# Patient Record
Sex: FEMALE | Race: WHITE | NOT HISPANIC OR LATINO | Employment: FULL TIME | URBAN - METROPOLITAN AREA
[De-identification: names, ages, dates, MRNs, and addresses within clinical notes are randomized per-mention and may not be internally consistent; named-entity substitution may affect disease eponyms.]

---

## 2018-03-05 ENCOUNTER — OFFICE VISIT CONVERTED (OUTPATIENT)
Dept: CARDIOLOGY | Facility: CLINIC | Age: 48
End: 2018-03-05
Attending: INTERNAL MEDICINE

## 2018-03-07 ENCOUNTER — OFFICE VISIT CONVERTED (OUTPATIENT)
Dept: FAMILY MEDICINE CLINIC | Facility: CLINIC | Age: 48
End: 2018-03-07
Attending: NURSE PRACTITIONER

## 2018-04-24 ENCOUNTER — CONVERSION ENCOUNTER (OUTPATIENT)
Dept: MAMMOGRAPHY | Facility: HOSPITAL | Age: 48
End: 2018-04-24

## 2018-06-20 ENCOUNTER — CONVERSION ENCOUNTER (OUTPATIENT)
Dept: MAMMOGRAPHY | Facility: HOSPITAL | Age: 48
End: 2018-06-20

## 2018-12-10 ENCOUNTER — OFFICE VISIT CONVERTED (OUTPATIENT)
Dept: CARDIOLOGY | Facility: CLINIC | Age: 48
End: 2018-12-10
Attending: INTERNAL MEDICINE

## 2019-01-08 ENCOUNTER — HOSPITAL ENCOUNTER (OUTPATIENT)
Dept: URGENT CARE | Facility: CLINIC | Age: 49
Discharge: HOME | End: 2019-01-08

## 2019-11-21 ENCOUNTER — OFFICE VISIT CONVERTED (OUTPATIENT)
Dept: FAMILY MEDICINE CLINIC | Facility: CLINIC | Age: 49
End: 2019-11-21
Attending: NURSE PRACTITIONER

## 2019-11-21 ENCOUNTER — HOSPITAL ENCOUNTER (OUTPATIENT)
Dept: FAMILY MEDICINE CLINIC | Facility: CLINIC | Age: 49
Discharge: HOME | End: 2019-11-21
Attending: NURSE PRACTITIONER

## 2019-11-21 LAB
ALBUMIN SERPL-MCNC: 4.3 G/DL (ref 3.5–5)
ALBUMIN/GLOB SERPL: 1.5 {RATIO} (ref 1.4–2.6)
ALP SERPL-CCNC: 67 U/L (ref 42–98)
ALT SERPL-CCNC: 27 U/L (ref 10–40)
ANION GAP SERPL CALC-SCNC: 21 MMOL/L (ref 8–19)
AST SERPL-CCNC: 41 U/L (ref 15–50)
BASOPHILS # BLD AUTO: 0.03 10*3/UL (ref 0–0.2)
BASOPHILS NFR BLD AUTO: 0.7 % (ref 0–3)
BILIRUB SERPL-MCNC: 0.35 MG/DL (ref 0.2–1.3)
BUN SERPL-MCNC: 14 MG/DL (ref 5–25)
BUN/CREAT SERPL: 18 {RATIO} (ref 6–20)
CALCIUM SERPL-MCNC: 9.6 MG/DL (ref 8.7–10.4)
CHLORIDE SERPL-SCNC: 101 MMOL/L (ref 99–111)
CHOLEST SERPL-MCNC: 137 MG/DL (ref 107–200)
CHOLEST/HDLC SERPL: 2.5 {RATIO} (ref 3–6)
CONV ABS IMM GRAN: 0.01 10*3/UL (ref 0–0.2)
CONV CO2: 21 MMOL/L (ref 22–32)
CONV IMMATURE GRAN: 0.2 % (ref 0–1.8)
CONV TOTAL PROTEIN: 7.2 G/DL (ref 6.3–8.2)
CREAT UR-MCNC: 0.76 MG/DL (ref 0.5–0.9)
DEPRECATED RDW RBC AUTO: 49.1 FL (ref 36.4–46.3)
EOSINOPHIL # BLD AUTO: 0.02 10*3/UL (ref 0–0.7)
EOSINOPHIL # BLD AUTO: 0.4 % (ref 0–7)
ERYTHROCYTE [DISTWIDTH] IN BLOOD BY AUTOMATED COUNT: 13.2 % (ref 11.7–14.4)
FOLATE SERPL-MCNC: 3.8 NG/ML (ref 4.8–20)
GFR SERPLBLD BASED ON 1.73 SQ M-ARVRAT: >60 ML/MIN/{1.73_M2}
GLOBULIN UR ELPH-MCNC: 2.9 G/DL (ref 2–3.5)
GLUCOSE SERPL-MCNC: 116 MG/DL (ref 65–99)
HCT VFR BLD AUTO: 37.5 % (ref 37–47)
HDLC SERPL-MCNC: 54 MG/DL (ref 40–60)
HGB BLD-MCNC: 12.7 G/DL (ref 12–16)
LDLC SERPL CALC-MCNC: 16 MG/DL (ref 70–100)
LYMPHOCYTES # BLD AUTO: 1.42 10*3/UL (ref 1–5)
LYMPHOCYTES NFR BLD AUTO: 30.9 % (ref 20–45)
MCH RBC QN AUTO: 34.4 PG (ref 27–31)
MCHC RBC AUTO-ENTMCNC: 33.9 G/DL (ref 33–37)
MCV RBC AUTO: 101.6 FL (ref 81–99)
MONOCYTES # BLD AUTO: 0.35 10*3/UL (ref 0.2–1.2)
MONOCYTES NFR BLD AUTO: 7.6 % (ref 3–10)
NEUTROPHILS # BLD AUTO: 2.76 10*3/UL (ref 2–8)
NEUTROPHILS NFR BLD AUTO: 60.2 % (ref 30–85)
NRBC CBCN: 0 % (ref 0–0.7)
OSMOLALITY SERPL CALC.SUM OF ELEC: 289 MOSM/KG (ref 273–304)
PLATELET # BLD AUTO: 230 10*3/UL (ref 130–400)
PMV BLD AUTO: 10.7 FL (ref 9.4–12.3)
POTASSIUM SERPL-SCNC: 4.1 MMOL/L (ref 3.5–5.3)
RBC # BLD AUTO: 3.69 10*6/UL (ref 4.2–5.4)
SODIUM SERPL-SCNC: 139 MMOL/L (ref 135–147)
TRIGL SERPL-MCNC: 334 MG/DL (ref 40–150)
TSH SERPL-ACNC: 4.09 M[IU]/L (ref 0.27–4.2)
VIT B12 SERPL-MCNC: 242 PG/ML (ref 211–911)
VLDLC SERPL-MCNC: 67 MG/DL (ref 5–37)
WBC # BLD AUTO: 4.59 10*3/UL (ref 4.8–10.8)

## 2019-12-16 ENCOUNTER — OFFICE VISIT CONVERTED (OUTPATIENT)
Dept: CARDIOLOGY | Facility: CLINIC | Age: 49
End: 2019-12-16
Attending: INTERNAL MEDICINE

## 2019-12-31 ENCOUNTER — OFFICE VISIT CONVERTED (OUTPATIENT)
Dept: FAMILY MEDICINE CLINIC | Facility: CLINIC | Age: 49
End: 2019-12-31
Attending: NURSE PRACTITIONER

## 2020-01-08 ENCOUNTER — HOSPITAL ENCOUNTER (OUTPATIENT)
Dept: MAMMOGRAPHY | Facility: HOSPITAL | Age: 50
Discharge: HOME | End: 2020-01-08
Attending: NURSE PRACTITIONER

## 2020-01-10 ENCOUNTER — CONVERSION ENCOUNTER (OUTPATIENT)
Dept: SURGERY | Facility: CLINIC | Age: 50
End: 2020-01-10

## 2020-01-10 ENCOUNTER — OFFICE VISIT CONVERTED (OUTPATIENT)
Dept: SURGERY | Facility: CLINIC | Age: 50
End: 2020-01-10
Attending: NURSE PRACTITIONER

## 2020-02-21 ENCOUNTER — HOSPITAL ENCOUNTER (OUTPATIENT)
Dept: GASTROENTEROLOGY | Facility: HOSPITAL | Age: 50
Setting detail: HOSPITAL OUTPATIENT SURGERY
Discharge: HOME | End: 2020-02-21
Attending: SURGERY

## 2021-01-11 ENCOUNTER — OFFICE VISIT CONVERTED (OUTPATIENT)
Dept: CARDIOLOGY | Facility: CLINIC | Age: 51
End: 2021-01-11
Attending: INTERNAL MEDICINE

## 2021-01-11 ENCOUNTER — HOSPITAL ENCOUNTER (OUTPATIENT)
Dept: OTHER | Facility: HOSPITAL | Age: 51
Discharge: HOME | End: 2021-01-11
Attending: INTERNAL MEDICINE

## 2021-01-11 LAB
ALBUMIN SERPL-MCNC: 4.3 G/DL (ref 3.5–5)
ALBUMIN/GLOB SERPL: 1.4 {RATIO} (ref 1.4–2.6)
ALP SERPL-CCNC: 86 U/L (ref 53–141)
ALT SERPL-CCNC: 54 U/L (ref 10–40)
ANION GAP SERPL CALC-SCNC: 17 MMOL/L (ref 8–19)
AST SERPL-CCNC: 74 U/L (ref 15–50)
BASOPHILS # BLD AUTO: 0.04 10*3/UL (ref 0–0.2)
BASOPHILS NFR BLD AUTO: 0.7 % (ref 0–3)
BILIRUB SERPL-MCNC: 0.31 MG/DL (ref 0.2–1.3)
BUN SERPL-MCNC: 10 MG/DL (ref 5–25)
BUN/CREAT SERPL: 12 {RATIO} (ref 6–20)
CALCIUM SERPL-MCNC: 9.4 MG/DL (ref 8.7–10.4)
CHLORIDE SERPL-SCNC: 104 MMOL/L (ref 99–111)
CHOLEST SERPL-MCNC: 136 MG/DL (ref 107–200)
CHOLEST/HDLC SERPL: 2.3 {RATIO} (ref 3–6)
CONV ABS IMM GRAN: 0.02 10*3/UL (ref 0–0.2)
CONV CO2: 24 MMOL/L (ref 22–32)
CONV IMMATURE GRAN: 0.3 % (ref 0–1.8)
CONV TOTAL PROTEIN: 7.4 G/DL (ref 6.3–8.2)
CREAT UR-MCNC: 0.85 MG/DL (ref 0.5–0.9)
DEPRECATED RDW RBC AUTO: 53.9 FL (ref 36.4–46.3)
EOSINOPHIL # BLD AUTO: 0.03 10*3/UL (ref 0–0.7)
EOSINOPHIL # BLD AUTO: 0.5 % (ref 0–7)
ERYTHROCYTE [DISTWIDTH] IN BLOOD BY AUTOMATED COUNT: 13.6 % (ref 11.7–14.4)
GFR SERPLBLD BASED ON 1.73 SQ M-ARVRAT: >60 ML/MIN/{1.73_M2}
GLOBULIN UR ELPH-MCNC: 3.1 G/DL (ref 2–3.5)
GLUCOSE SERPL-MCNC: 110 MG/DL (ref 65–99)
HCT VFR BLD AUTO: 41.1 % (ref 37–47)
HDLC SERPL-MCNC: 60 MG/DL (ref 40–60)
HGB BLD-MCNC: 13.7 G/DL (ref 12–16)
LDLC SERPL CALC-MCNC: 43 MG/DL (ref 70–100)
LYMPHOCYTES # BLD AUTO: 1.76 10*3/UL (ref 1–5)
LYMPHOCYTES NFR BLD AUTO: 29 % (ref 20–45)
MCH RBC QN AUTO: 35.8 PG (ref 27–31)
MCHC RBC AUTO-ENTMCNC: 33.3 G/DL (ref 33–37)
MCV RBC AUTO: 107.3 FL (ref 81–99)
MONOCYTES # BLD AUTO: 0.46 10*3/UL (ref 0.2–1.2)
MONOCYTES NFR BLD AUTO: 7.6 % (ref 3–10)
NEUTROPHILS # BLD AUTO: 3.75 10*3/UL (ref 2–8)
NEUTROPHILS NFR BLD AUTO: 61.9 % (ref 30–85)
NRBC CBCN: 0 % (ref 0–0.7)
OSMOLALITY SERPL CALC.SUM OF ELEC: 292 MOSM/KG (ref 273–304)
PLATELET # BLD AUTO: 212 10*3/UL (ref 130–400)
PMV BLD AUTO: 10.5 FL (ref 9.4–12.3)
POTASSIUM SERPL-SCNC: 4.3 MMOL/L (ref 3.5–5.3)
RBC # BLD AUTO: 3.83 10*6/UL (ref 4.2–5.4)
SODIUM SERPL-SCNC: 141 MMOL/L (ref 135–147)
TRIGL SERPL-MCNC: 167 MG/DL (ref 40–150)
VLDLC SERPL-MCNC: 33 MG/DL (ref 5–37)
WBC # BLD AUTO: 6.06 10*3/UL (ref 4.8–10.8)

## 2021-05-14 VITALS
DIASTOLIC BLOOD PRESSURE: 86 MMHG | HEIGHT: 64 IN | SYSTOLIC BLOOD PRESSURE: 126 MMHG | BODY MASS INDEX: 29.53 KG/M2 | WEIGHT: 173 LBS | HEART RATE: 78 BPM

## 2021-05-14 NOTE — PROGRESS NOTES
"   Progress Note      Patient Name: Loan Sylvester   Patient ID: [id number]   Sex: Female   YOB: 1970    Primary Care Provider: Destinee BELL    Visit Date: January 11, 2021    Provider: Kristian Enriquez MD   Location: [facility]   Location Address: [address] [place] [address], [facility]   TARUN Pacheco  [id number]   Location Phone: [phone]          Chief Complaint     Followup visit for coronary artery disease and hypertension.       History Of Present Illness  Loan Sylvester is a 50 year old /White female with coronary artery disease, hypertension, and previous angioplasty who is here for a followup visit. Patient denies having any symptoms at this time; specifically, there is no chest pain, shortness of breath, or palpitations. Taking all the medications as prescribed. No recent labs available.   PAST MEDICAL HISTORY: 1) Coronary artery disease, initial presentation non-STEMI years back. The patient underwent balloon angioplasty by Dr. Siva Jacobs with no stent placement; 2) True aspirin allergy; 3) Hypertension; 4) Negative for diabetes mellitus.   PSYCHOSOCIAL HISTORY: Denies tobacco use. Rarely consumes alcohol.   CURRENT MEDICATIONS: Norvasc 5 mg daily; Plavix 75 mg daily; Toprol XL 50 mg daily; Crestor 5 mg daily.      ALLERGIES:  Aspirin; NSAIDs.       Review of Systems  · Cardiovascular  o Denies  o : palpitations (fast, fluttering, or skipping beats), swelling (feet, ankles, hands), shortness of breath while walking or lying flat, chest pain or angina pectoris   · Respiratory  o Denies  o : chronic or frequent cough      Vitals  Date Time BP Position Site L\R Cuff Size HR RR TEMP (F) WT  HT  BMI kg/m2 BSA m2 O2 Sat FR L/min FiO2 HC       01/11/2021 08:36 /86 Sitting    78 - R   173lbs 0oz 5'  4\" 29.7 1.88             Physical Examination  · Respiratory  o Auscultation of Lungs  o : Clear to auscultation bilaterally. No crackles or " rhonchi.  · Cardiovascular  o Heart  o : S1, S2 is normally heard. No S3. No murmur, rubs, or gallops.  · Gastrointestinal  o Abdominal Examination  o : Soft, nontender, nondistended. No free fluid. Bowel sounds heard in all four quadrants.  · Extremities  o Extremities  o : Warm and well perfused. No pitting pedal edema. Distal pulses present.          Assessment     ASSESSMENT & PLAN:    1.  Coronary artery disease.  Previous angioplasty.  Stable with no angina.  Preserved LV function.  Continue        Plavix, statin, and beta-blocker.  Patient is ALLERGIC to ASPIRIN; hence, she is on Plavix.    2.  Hypertension.  Very well controlled.  Continue current regimen.  3.  Hyperlipidemia.  Continue Crestor 5 mg daily.  Will check a lipid panel now and adjust the dose if needed.  4.  Will follow the lab report; otherwise, follow up in one year.        MD TEODORA ZhuV:vm                  Electronically Signed by: Laurie Alexandre-, Other -Author on January 15, 2021 11:54:30 AM  Electronically Co-signed by: Kristian Enriquez MD -Reviewer on January 15, 2021 12:02:22 PM

## 2021-05-15 VITALS
DIASTOLIC BLOOD PRESSURE: 96 MMHG | HEIGHT: 64 IN | WEIGHT: 173 LBS | SYSTOLIC BLOOD PRESSURE: 130 MMHG | BODY MASS INDEX: 29.53 KG/M2 | HEART RATE: 84 BPM

## 2021-05-15 VITALS
RESPIRATION RATE: 16 BRPM | BODY MASS INDEX: 29.29 KG/M2 | WEIGHT: 171.56 LBS | OXYGEN SATURATION: 97 % | HEIGHT: 64 IN | SYSTOLIC BLOOD PRESSURE: 118 MMHG | DIASTOLIC BLOOD PRESSURE: 76 MMHG | TEMPERATURE: 97.9 F | HEART RATE: 88 BPM

## 2021-05-15 VITALS — HEIGHT: 64 IN | RESPIRATION RATE: 16 BRPM | BODY MASS INDEX: 29.37 KG/M2 | WEIGHT: 172 LBS

## 2021-05-15 VITALS
SYSTOLIC BLOOD PRESSURE: 116 MMHG | DIASTOLIC BLOOD PRESSURE: 84 MMHG | WEIGHT: 171 LBS | BODY MASS INDEX: 29.19 KG/M2 | HEART RATE: 84 BPM | HEIGHT: 64 IN

## 2021-05-15 VITALS
HEART RATE: 86 BPM | RESPIRATION RATE: 16 BRPM | TEMPERATURE: 97 F | WEIGHT: 172.06 LBS | OXYGEN SATURATION: 97 % | DIASTOLIC BLOOD PRESSURE: 89 MMHG | SYSTOLIC BLOOD PRESSURE: 115 MMHG | BODY MASS INDEX: 29.38 KG/M2 | HEIGHT: 64 IN

## 2021-05-16 VITALS
HEIGHT: 64 IN | WEIGHT: 174 LBS | BODY MASS INDEX: 29.71 KG/M2 | DIASTOLIC BLOOD PRESSURE: 88 MMHG | HEART RATE: 76 BPM | SYSTOLIC BLOOD PRESSURE: 126 MMHG

## 2021-05-16 VITALS
DIASTOLIC BLOOD PRESSURE: 74 MMHG | BODY MASS INDEX: 29.44 KG/M2 | WEIGHT: 172.44 LBS | TEMPERATURE: 98.3 F | RESPIRATION RATE: 16 BRPM | HEART RATE: 72 BPM | SYSTOLIC BLOOD PRESSURE: 111 MMHG | OXYGEN SATURATION: 97 % | HEIGHT: 64 IN

## 2022-01-10 ENCOUNTER — LAB (OUTPATIENT)
Dept: LAB | Facility: HOSPITAL | Age: 52
End: 2022-01-10

## 2022-01-10 ENCOUNTER — OFFICE VISIT (OUTPATIENT)
Dept: CARDIOLOGY | Facility: CLINIC | Age: 52
End: 2022-01-10

## 2022-01-10 VITALS
SYSTOLIC BLOOD PRESSURE: 127 MMHG | DIASTOLIC BLOOD PRESSURE: 83 MMHG | BODY MASS INDEX: 28.51 KG/M2 | HEART RATE: 82 BPM | HEIGHT: 64 IN | WEIGHT: 167 LBS

## 2022-01-10 DIAGNOSIS — I25.10 CORONARY ARTERY DISEASE INVOLVING NATIVE CORONARY ARTERY OF NATIVE HEART WITHOUT ANGINA PECTORIS: ICD-10-CM

## 2022-01-10 DIAGNOSIS — I25.10 CORONARY ARTERY DISEASE INVOLVING NATIVE CORONARY ARTERY OF NATIVE HEART WITHOUT ANGINA PECTORIS: Primary | ICD-10-CM

## 2022-01-10 DIAGNOSIS — E78.2 MIXED HYPERLIPIDEMIA: ICD-10-CM

## 2022-01-10 DIAGNOSIS — I10 ESSENTIAL HYPERTENSION: ICD-10-CM

## 2022-01-10 LAB
ALBUMIN SERPL-MCNC: 4.8 G/DL (ref 3.5–5.2)
ALBUMIN/GLOB SERPL: 1.7 G/DL
ALP SERPL-CCNC: 67 U/L (ref 39–117)
ALT SERPL W P-5'-P-CCNC: 35 U/L (ref 1–33)
ANION GAP SERPL CALCULATED.3IONS-SCNC: 12.2 MMOL/L (ref 5–15)
AST SERPL-CCNC: 46 U/L (ref 1–32)
BILIRUB SERPL-MCNC: 0.3 MG/DL (ref 0–1.2)
BUN SERPL-MCNC: 14 MG/DL (ref 6–20)
BUN/CREAT SERPL: 17.5 (ref 7–25)
CALCIUM SPEC-SCNC: 10 MG/DL (ref 8.6–10.5)
CHLORIDE SERPL-SCNC: 102 MMOL/L (ref 98–107)
CHOLEST SERPL-MCNC: 197 MG/DL (ref 0–200)
CO2 SERPL-SCNC: 26.8 MMOL/L (ref 22–29)
CREAT SERPL-MCNC: 0.8 MG/DL (ref 0.57–1)
GFR SERPL CREATININE-BSD FRML MDRD: 75 ML/MIN/1.73
GLOBULIN UR ELPH-MCNC: 2.8 GM/DL
GLUCOSE SERPL-MCNC: 115 MG/DL (ref 65–99)
HDLC SERPL-MCNC: 61 MG/DL (ref 40–60)
LDLC SERPL CALC-MCNC: 89 MG/DL (ref 0–100)
LDLC/HDLC SERPL: 1.28 {RATIO}
POTASSIUM SERPL-SCNC: 4.9 MMOL/L (ref 3.5–5.2)
PROT SERPL-MCNC: 7.6 G/DL (ref 6–8.5)
SODIUM SERPL-SCNC: 141 MMOL/L (ref 136–145)
TRIGL SERPL-MCNC: 289 MG/DL (ref 0–150)
VLDLC SERPL-MCNC: 47 MG/DL (ref 5–40)

## 2022-01-10 PROCEDURE — 36415 COLL VENOUS BLD VENIPUNCTURE: CPT

## 2022-01-10 PROCEDURE — 80061 LIPID PANEL: CPT

## 2022-01-10 PROCEDURE — 80053 COMPREHEN METABOLIC PANEL: CPT

## 2022-01-10 PROCEDURE — 99214 OFFICE O/P EST MOD 30 MIN: CPT | Performed by: INTERNAL MEDICINE

## 2022-01-10 RX ORDER — AMLODIPINE BESYLATE 5 MG/1
5 TABLET ORAL DAILY
Qty: 30 TABLET | Refills: 11 | Status: SHIPPED | OUTPATIENT
Start: 2022-01-10 | End: 2023-01-10 | Stop reason: SDUPTHER

## 2022-01-10 RX ORDER — CETIRIZINE HYDROCHLORIDE 10 MG/1
10 TABLET ORAL DAILY
COMMUNITY

## 2022-01-10 RX ORDER — AMLODIPINE BESYLATE 5 MG/1
5 TABLET ORAL DAILY
COMMUNITY
Start: 2021-12-16 | End: 2022-01-10 | Stop reason: SDUPTHER

## 2022-01-10 RX ORDER — CLOPIDOGREL BISULFATE 75 MG/1
75 TABLET ORAL DAILY
Qty: 30 TABLET | Refills: 11 | Status: SHIPPED | OUTPATIENT
Start: 2022-01-10 | End: 2023-01-10 | Stop reason: SDUPTHER

## 2022-01-10 RX ORDER — METOPROLOL SUCCINATE 50 MG/1
50 TABLET, EXTENDED RELEASE ORAL DAILY
COMMUNITY
Start: 2021-12-16 | End: 2022-01-10 | Stop reason: SDUPTHER

## 2022-01-10 RX ORDER — METOPROLOL SUCCINATE 50 MG/1
50 TABLET, EXTENDED RELEASE ORAL DAILY
Qty: 30 TABLET | Refills: 11 | Status: SHIPPED | OUTPATIENT
Start: 2022-01-10 | End: 2023-01-10 | Stop reason: SDUPTHER

## 2022-01-10 RX ORDER — CLOPIDOGREL BISULFATE 75 MG/1
75 TABLET ORAL DAILY
COMMUNITY
End: 2022-01-10 | Stop reason: SDUPTHER

## 2022-01-10 RX ORDER — ROSUVASTATIN CALCIUM 5 MG/1
5 TABLET, COATED ORAL DAILY
COMMUNITY
Start: 2021-12-16 | End: 2022-01-10 | Stop reason: SDUPTHER

## 2022-01-10 RX ORDER — ROSUVASTATIN CALCIUM 5 MG/1
5 TABLET, COATED ORAL DAILY
Qty: 30 TABLET | Refills: 11 | Status: SHIPPED | OUTPATIENT
Start: 2022-01-10 | End: 2023-01-10 | Stop reason: SDUPTHER

## 2022-01-14 ENCOUNTER — TELEPHONE (OUTPATIENT)
Dept: CARDIOLOGY | Facility: CLINIC | Age: 52
End: 2022-01-14

## 2022-01-14 NOTE — TELEPHONE ENCOUNTER
Labs done on 1/10/2020 reviewed    Kidney functions, sodium, potassium are all normal  Liver enzymes are slightly elevated.  However they are better than last years numbers.    Also noted that there are some increase in cholesterol levels.  However we are unable to increase the dose of statins because of mildly elevated liver enzymes.  Recommend to continue all the current medications for now.      Called pt, no answer.  Unable to leave a VM.  Will send a [employer] message.

## 2022-01-14 NOTE — PROGRESS NOTES
Labs done on 1/10/2020 reviewed    Kidney functions, sodium, potassium are all normal  Liver enzymes are slightly elevated.  However they are better than last years numbers.    Also noted that there are some increase in cholesterol levels.  However we are unable to increase the dose of statins because of mildly elevated liver enzymes.  Recommend to continue all the current medications for now.      Electronically signed by Kristian Enriquez MD, 01/13/22, 7:18 PM EST.

## 2022-01-16 PROBLEM — E78.2 MIXED HYPERLIPIDEMIA: Status: ACTIVE | Noted: 2022-01-16

## 2022-01-16 PROBLEM — I25.10 CORONARY ARTERY DISEASE INVOLVING NATIVE CORONARY ARTERY OF NATIVE HEART WITHOUT ANGINA PECTORIS: Status: ACTIVE | Noted: 2022-01-16

## 2022-01-16 PROBLEM — I10 ESSENTIAL HYPERTENSION: Status: ACTIVE | Noted: 2022-01-16

## 2022-01-16 NOTE — PROGRESS NOTES
CARDIOLOGY FOLLOW-UP PROGRESS NOTE        Chief Complaint  Coronary Artery Disease (Follow-up), Hyperlipidemia, and Hypertension    Subjective            Loan Sylvester presents to [facility]  History of Present Illness    Ms Mary Marsh is here for annual follow-up. She denies having any chest pain, shortness of breath or palpitations. Fairly active at baseline. Taking all the medications including Plavix as prescribed earlier.       Past History:     1) Coronary artery disease, Index presentation non-STEMI years back. The patient underwent balloon angioplasty by Dr. Siva Jacobs with no stent placement; 2) True aspirin allergy; 3) Hypertension; 4) Negative for diabetes mellitus.     Medical History:  Past Medical History:   Diagnosis Date   • Coronary artery disease    • Hyperlipidemia    • Hypertension        Surgical History: has a past surgical history that includes Cardiac catheterization.     Family History: family history includes No Known Problems in her father and mother.     Social History: reports that she has never smoked. She has never used smokeless tobacco. She reports current alcohol use. She reports that she does not use drugs.    Allergies: Aspirin and Ibuprofen    Current Outpatient Medications on File Prior to Visit   Medication Sig   • cetirizine (ZyrTEC Allergy) 10 MG tablet Take 10 mg by mouth Daily.   -     metoprolol succinate XL (TOPROL-XL) 50 MG 24 hr tablet; Take 1 tablet by mouth Daily.  Dispense: 30 tablet; Refill: 11  -     amLODIPine (NORVASC) 5 MG tablet; Take 1 tablet by mouth Daily.  Dispense: 30 tablet; Refill: 11  -     rosuvastatin (CRESTOR) 5 MG tablet; Take 1 tablet by mouth Daily.  Dispense: 30 tablet; Refill: 11  -     clopidogrel (Plavix) 75 MG tablet; Take 1 tablet by mouth Daily.  Dispense: 30 tablet; Refill: 11    Review of Systems   Respiratory: Negative for cough, shortness of breath and wheezing.    Cardiovascular: Negative  "for chest pain, palpitations and leg swelling.   Gastrointestinal: Negative for nausea and vomiting.   Neurological: Negative for dizziness and syncope.        Objective     /83   Pulse 82   Ht 162.6 cm (64\")   Wt 75.8 kg (167 lb)   BMI 28.67 kg/m²       Physical Exam    General : Alert, awake, no acute distress  Neck : Supple, no carotid bruit, no jugular venous distention  CVS : Regular rate and rhythm, no murmur, rubs or gallops  Lungs: Clear to auscultation bilaterally, no crackles or rhonchi  Abdomen: Soft, nontender, bowel sounds heard in all 4 quadrants  Extremities: Warm, well-perfused, no pedal edema    Result Review :     The following data was reviewed by: Kristian Enriquez MD on 01/10/2022:    No recent labs available for review           Assessment and Plan        Diagnoses and all orders for this visit:    1. Coronary artery disease involving native coronary artery of native heart without angina pectoris (Primary)  Assessment & Plan:  She is currently stable with no angina. LV function is preserved. Continue Plavix, statins and beta-blockers. She has true aspirin allergy, hence taking Plavix instead.    Orders:  -     metoprolol succinate XL (TOPROL-XL) 50 MG 24 hr tablet; Take 1 tablet by mouth Daily.  Dispense: 30 tablet; Refill: 11  -     rosuvastatin (CRESTOR) 5 MG tablet; Take 1 tablet by mouth Daily.  Dispense: 30 tablet; Refill: 11  -     clopidogrel (Plavix) 75 MG tablet; Take 1 tablet by mouth Daily.  Dispense: 30 tablet; Refill: 11  -     Comprehensive Metabolic Panel; Future  -     Lipid Panel; Future    2. Mixed hyperlipidemia  Assessment & Plan:  She is on low-dose Crestor which will be continued. She has history of abnormal liver enzymes. We will repeat the CMP and lipid panel now since no labs available in the past 1 year.    Orders:  -     rosuvastatin (CRESTOR) 5 MG tablet; Take 1 tablet by mouth Daily.  Dispense: 30 tablet; Refill: 11  -     Comprehensive Metabolic Panel; " Future  -     Lipid Panel; Future    3. Essential hypertension  Assessment & Plan:  Very well controlled. Continue amlodipine and metoprolol at the current dose.    Orders:  -     metoprolol succinate XL (TOPROL-XL) 50 MG 24 hr tablet; Take 1 tablet by mouth Daily.  Dispense: 30 tablet; Refill: 11  -     amLODIPine (NORVASC) 5 MG tablet; Take 1 tablet by mouth Daily.  Dispense: 30 tablet; Refill: 11            Follow Up     Return in about 1 year (around 1/10/2023) for Next scheduled follow up. We will follow the lab reports once available.    Patient was given instructions and counseling regarding her condition or for health maintenance advice. Please see specific information pulled into the AVS if appropriate.

## 2022-01-16 NOTE — ASSESSMENT & PLAN NOTE
She is currently stable with no angina. LV function is preserved. Continue Plavix, statins and beta-blockers. She has true aspirin allergy, hence taking Plavix instead.

## 2022-01-16 NOTE — ASSESSMENT & PLAN NOTE
She is on low-dose Crestor which will be continued. She has history of abnormal liver enzymes. We will repeat the CMP and lipid panel now since no labs available in the past 1 year.

## 2023-01-10 ENCOUNTER — OFFICE VISIT (OUTPATIENT)
Dept: CARDIOLOGY | Facility: CLINIC | Age: 53
End: 2023-01-10
Payer: COMMERCIAL

## 2023-01-10 VITALS
WEIGHT: 171.8 LBS | BODY MASS INDEX: 29.33 KG/M2 | HEART RATE: 80 BPM | DIASTOLIC BLOOD PRESSURE: 77 MMHG | HEIGHT: 64 IN | SYSTOLIC BLOOD PRESSURE: 112 MMHG

## 2023-01-10 DIAGNOSIS — I10 ESSENTIAL HYPERTENSION: ICD-10-CM

## 2023-01-10 DIAGNOSIS — I25.10 CORONARY ARTERY DISEASE INVOLVING NATIVE CORONARY ARTERY OF NATIVE HEART WITHOUT ANGINA PECTORIS: Primary | ICD-10-CM

## 2023-01-10 DIAGNOSIS — E78.2 MIXED HYPERLIPIDEMIA: ICD-10-CM

## 2023-01-10 PROBLEM — J30.9 ALLERGIC RHINITIS: Status: ACTIVE | Noted: 2023-01-10

## 2023-01-10 PROCEDURE — 99213 OFFICE O/P EST LOW 20 MIN: CPT | Performed by: INTERNAL MEDICINE

## 2023-01-10 RX ORDER — ROSUVASTATIN CALCIUM 5 MG/1
5 TABLET, COATED ORAL DAILY
Qty: 30 TABLET | Refills: 11 | Status: SHIPPED | OUTPATIENT
Start: 2023-01-10

## 2023-01-10 RX ORDER — CLOPIDOGREL BISULFATE 75 MG/1
75 TABLET ORAL DAILY
Qty: 30 TABLET | Refills: 11 | Status: SHIPPED | OUTPATIENT
Start: 2023-01-10

## 2023-01-10 RX ORDER — METOPROLOL SUCCINATE 50 MG/1
50 TABLET, EXTENDED RELEASE ORAL DAILY
Qty: 30 TABLET | Refills: 11 | Status: SHIPPED | OUTPATIENT
Start: 2023-01-10

## 2023-01-10 RX ORDER — AMLODIPINE BESYLATE 5 MG/1
5 TABLET ORAL DAILY
Qty: 30 TABLET | Refills: 11 | Status: SHIPPED | OUTPATIENT
Start: 2023-01-10

## 2023-01-10 NOTE — PROGRESS NOTES
CARDIOLOGY FOLLOW-UP PROGRESS NOTE        Chief Complaint  Follow-up and Coronary Artery Disease    Subjective            Loan Sylvester presents to [facility]  History of Present Illness      Ms Sylvester is here for annual follow-up visit for coronary disease and hyperlipidemia.  She denies any recent episodes of chest pain, shortness of breath or palpitations.  She is taking all the medicines as prescribed.  No recent hospitalizations or ER visits.       Past History:     1) Coronary artery disease, Index presentation non-STEMI years back. The patient underwent balloon angioplasty by Dr. Siva Jacobs with no stent placement; 2) True aspirin allergy; 3) Hypertension; 4) Negative for diabetes mellitus.     Medical History:  Past Medical History:   Diagnosis Date   • Coronary artery disease    • Hyperlipidemia    • Hypertension        Surgical History: has a past surgical history that includes Cardiac catheterization.     Family History: family history includes No Known Problems in her father and mother.     Social History: reports that she has never smoked. She has never used smokeless tobacco. She reports current alcohol use. She reports that she does not use drugs.    Allergies: Aspirin and Ibuprofen    Current Outpatient Medications on File Prior to Visit   Medication Sig   • cetirizine (zyrTEC) 10 MG tablet Take 10 mg by mouth Daily.   •  amLODIPine (NORVASC) 5 MG tablet Take 1 tablet by mouth Daily.   • clopidogrel (Plavix) 75 MG tablet Take 1 tablet by mouth Daily.   • metoprolol succinate XL (TOPROL-XL) 50 MG 24 hr tablet Take 1 tablet by mouth Daily.   •  rosuvastatin (CRESTOR) 5 MG tablet Take 1 tablet by mouth Daily.           Review of Systems   Respiratory: Negative for cough, shortness of breath and wheezing.    Cardiovascular: Negative for chest pain, palpitations and leg swelling.   Gastrointestinal: Negative for nausea and vomiting.   Neurological:  Negative for dizziness and syncope.        Objective     /77   Pulse 80   Ht 162.6 cm (64\")   Wt 77.9 kg (171 lb 12.8 oz)   BMI 29.49 kg/m²       Physical Exam    General : Alert, awake, no acute distress  Neck : Supple, no carotid bruit, no jugular venous distention  CVS : Regular rate and rhythm, no murmur, rubs or gallops  Lungs: Clear to auscultation bilaterally, no crackles or rhonchi  Abdomen: Soft, nontender, bowel sounds heard in all 4 quadrants  Extremities: Warm, well-perfused, no pedal edema    Result Review :     The following data was reviewed by: Kristian Enriquez MD on 01/10/2023:                 Data reviewed: Cardiology studies                         Assessment and Plan        Diagnoses and all orders for this visit:    1. Coronary artery disease involving native coronary artery of native heart without angina pectoris (Primary)  Assessment & Plan:  Currently stable with no angina-like symptoms with LV systolic function is preserved.  Continue aspirin, statin beta-blocker at the current dose.    Orders:  -     clopidogrel (Plavix) 75 MG tablet; Take 1 tablet by mouth Daily.  Dispense: 30 tablet; Refill: 11  -     metoprolol succinate XL (TOPROL-XL) 50 MG 24 hr tablet; Take 1 tablet by mouth Daily.  Dispense: 30 tablet; Refill: 11  -     rosuvastatin (CRESTOR) 5 MG tablet; Take 1 tablet by mouth Daily.  Dispense: 30 tablet; Refill: 11  -     CBC (No Diff); Future  -     Lipid Panel; Future    2. Essential hypertension  Assessment & Plan:  Blood pressure very well controlled.  Continue amlodipine and metoprolol succinate at the current dose.  We will repeat complete metabolic panel now.    Orders:  -     amLODIPine (NORVASC) 5 MG tablet; Take 1 tablet by mouth Daily.  Dispense: 30 tablet; Refill: 11  -     metoprolol succinate XL (TOPROL-XL) 50 MG 24 hr tablet; Take 1 tablet by mouth Daily.  Dispense: 30 tablet; Refill: 11    3. Mixed hyperlipidemia  Assessment & Plan:  No recent lipid panel  available.  Continue rosuvastatin 5 mg daily.  We will repeat the CMP and lipid panel now and adjust the dose if needed.    Orders:  -     rosuvastatin (CRESTOR) 5 MG tablet; Take 1 tablet by mouth Daily.  Dispense: 30 tablet; Refill: 11  -     Comprehensive Metabolic Panel; Future  -     Lipid Panel; Future            Follow Up     Return in about 1 year (around 1/10/2024) for Next scheduled follow up.    Patient was given instructions and counseling regarding her condition or for health maintenance advice. Please see specific information pulled into the AVS if appropriate.

## 2023-01-10 NOTE — ASSESSMENT & PLAN NOTE
No recent lipid panel available.  Continue rosuvastatin 5 mg daily.  We will repeat the CMP and lipid panel now and adjust the dose if needed.

## 2023-01-10 NOTE — ASSESSMENT & PLAN NOTE
Blood pressure very well controlled.  Continue amlodipine and metoprolol succinate at the current dose.  We will repeat complete metabolic panel now.

## 2023-01-10 NOTE — ASSESSMENT & PLAN NOTE
Currently stable with no angina-like symptoms with LV systolic function is preserved.  Continue aspirin, statin beta-blocker at the current dose.

## 2024-01-12 DIAGNOSIS — E78.2 MIXED HYPERLIPIDEMIA: ICD-10-CM

## 2024-01-12 DIAGNOSIS — I25.10 CORONARY ARTERY DISEASE INVOLVING NATIVE CORONARY ARTERY OF NATIVE HEART WITHOUT ANGINA PECTORIS: ICD-10-CM

## 2024-01-12 DIAGNOSIS — I10 ESSENTIAL HYPERTENSION: ICD-10-CM

## 2024-01-12 RX ORDER — AMLODIPINE BESYLATE 5 MG/1
5 TABLET ORAL DAILY
Qty: 30 TABLET | Refills: 11 | Status: SHIPPED | OUTPATIENT
Start: 2024-01-12

## 2024-01-12 RX ORDER — ROSUVASTATIN CALCIUM 5 MG/1
5 TABLET, COATED ORAL DAILY
Qty: 30 TABLET | Refills: 11 | Status: SHIPPED | OUTPATIENT
Start: 2024-01-12

## 2024-01-12 RX ORDER — METOPROLOL SUCCINATE 50 MG/1
50 TABLET, EXTENDED RELEASE ORAL DAILY
Qty: 30 TABLET | Refills: 11 | Status: SHIPPED | OUTPATIENT
Start: 2024-01-12

## 2024-01-12 RX ORDER — CLOPIDOGREL BISULFATE 75 MG/1
75 TABLET ORAL DAILY
Qty: 30 TABLET | Refills: 11 | Status: SHIPPED | OUTPATIENT
Start: 2024-01-12

## 2024-02-15 ENCOUNTER — TELEPHONE (OUTPATIENT)
Dept: CARDIOLOGY | Facility: CLINIC | Age: 54
End: 2024-02-15
Payer: COMMERCIAL

## 2024-02-15 NOTE — TELEPHONE ENCOUNTER
Caller: Loan Sylvester    Relationship: Self    Best call back number: [phone]     What orders are you requesting (i.e. lab or imaging): LIPID PANEL     In what timeframe would the patient need to come in: NEXT WEEK     Where will you receive your lab/imaging services: [facility] ([facility])    Additional notes: PATIENT STATES SHE THOUGHT SHE ALREADY HAD AN ORDER FOR A LIPID PANEL.

## 2024-02-20 ENCOUNTER — TELEPHONE (OUTPATIENT)
Dept: CARDIOLOGY | Facility: CLINIC | Age: 54
End: 2024-02-20
Payer: COMMERCIAL

## 2024-05-09 ENCOUNTER — OFFICE VISIT (OUTPATIENT)
Dept: FAMILY MEDICINE CLINIC | Facility: CLINIC | Age: 54
End: 2024-05-09
Payer: COMMERCIAL

## 2024-05-09 VITALS
HEART RATE: 94 BPM | RESPIRATION RATE: 18 BRPM | HEIGHT: 64 IN | DIASTOLIC BLOOD PRESSURE: 60 MMHG | TEMPERATURE: 97.7 F | OXYGEN SATURATION: 96 % | BODY MASS INDEX: 27.96 KG/M2 | SYSTOLIC BLOOD PRESSURE: 100 MMHG | WEIGHT: 163.8 LBS

## 2024-05-09 DIAGNOSIS — Z12.31 SCREENING MAMMOGRAM FOR BREAST CANCER: ICD-10-CM

## 2024-05-09 DIAGNOSIS — J06.9 UPPER RESPIRATORY TRACT INFECTION, UNSPECIFIED TYPE: Primary | ICD-10-CM

## 2024-05-09 DIAGNOSIS — Z78.0 POSTMENOPAUSAL: ICD-10-CM

## 2024-05-09 DIAGNOSIS — R05.9 COUGH IN ADULT: ICD-10-CM

## 2024-05-09 PROCEDURE — 87428 SARSCOV & INF VIR A&B AG IA: CPT | Performed by: NURSE PRACTITIONER

## 2024-05-09 PROCEDURE — 99204 OFFICE O/P NEW MOD 45 MIN: CPT | Performed by: NURSE PRACTITIONER

## 2024-05-09 RX ORDER — AZITHROMYCIN 250 MG/1
TABLET, FILM COATED ORAL
Qty: 6 TABLET | Refills: 0 | Status: SHIPPED | OUTPATIENT
Start: 2024-05-09

## 2024-05-09 RX ORDER — BENZONATATE 200 MG/1
200 CAPSULE ORAL 3 TIMES DAILY PRN
Qty: 30 CAPSULE | Refills: 0 | Status: SHIPPED | OUTPATIENT
Start: 2024-05-09

## 2024-05-09 RX ORDER — DEXTROMETHORPHAN HYDROBROMIDE AND PROMETHAZINE HYDROCHLORIDE 15; 6.25 MG/5ML; MG/5ML
5 SYRUP ORAL NIGHTLY PRN
Qty: 120 ML | Refills: 0 | Status: SHIPPED | OUTPATIENT
Start: 2024-05-09

## 2024-05-09 RX ORDER — PREDNISONE 20 MG/1
TABLET ORAL
Qty: 18 TABLET | Refills: 0 | Status: SHIPPED | OUTPATIENT
Start: 2024-05-09

## 2024-05-09 RX ORDER — ACETAMINOPHEN 325 MG/1
650 TABLET ORAL EVERY 6 HOURS PRN
COMMUNITY

## 2024-05-12 NOTE — PROGRESS NOTES
Chief Complaint  Cough (States Monday symptoms started), Headache, and Sinusitis    Subjective          Loan Sylvester presents to [facility]  History of Present Illness  She is reestablishing care here.  She has not been here for a while.  She is here for sick visit.  She said Sunday and Monday she had a dry nagging cough then on Wednesday she had a sore throat her neck hurts all that from coughing.  She is now coughing up green-yellow mucus.  It started getting increased wetness Wednesday into Thursday.  She is not sleeping well at night.  She has had COVID in the past and she said this does not feel like COVID.  She denies any fever.  No nausea vomiting.  She is due a mammogram.  She is due a colonoscopy in February 2025.    She is followed by cardiology for her medications.    I did discuss that they have not done blood work for a while and that if they do not do it with the next visit we need to get some blood work.    Depression: Not at risk (5/9/2024)    PHQ-2     PHQ-2 Score: 0    and 5/9/2024    BMI is >= 25 and <30. (Overweight) The following options were offered after discussion;: exercise counseling/recommendations and nutrition counseling/recommendations         Allergies  Aspirin and Ibuprofen    Social History     Tobacco Use    Smoking status: Never    Smokeless tobacco: Never   Vaping Use    Vaping status: Never Used   Substance Use Topics    Alcohol use: Yes     Comment: occassionally    Drug use: Never       Family History   Problem Relation Age of Onset    Other Mother         smoker    Hypertension Mother     Hyperlipidemia Mother     Other Father         smoker    Hyperlipidemia Father     Hypertension Father         Health Maintenance Due   Topic Date Due    TDAP/TD VACCINES (1 - Tdap) Never done    ZOSTER VACCINE (1 of 2) Never done    MAMMOGRAM  01/09/2022    HEPATITIS C SCREENING  Never done    ANNUAL PHYSICAL  Never done    LIPID PANEL  01/10/2023  "   COVID-19 Vaccine (1 - 2023-24 season) Never done          There is no immunization history on file for this patient.    Review of Systems   Constitutional:  Negative for chills and fever.   HENT:  Positive for congestion, postnasal drip, rhinorrhea, sinus pressure and sore throat.    Respiratory:  Positive for cough. Negative for shortness of breath.    Gastrointestinal:  Negative for diarrhea, nausea and vomiting.   Skin:  Negative for rash.        Objective       Vitals:    05/09/24 1142   BP: 100/60   Pulse: 94   Resp: 18   Temp: 97.7 °F (36.5 °C)   SpO2: 96%   Weight: 74.3 kg (163 lb 12.8 oz)   Height: 162.6 cm (64\")       Body mass index is 28.12 kg/m².         Physical Exam  Vitals reviewed.   Constitutional:       Appearance: Normal appearance. She is well-developed.   HENT:      Right Ear: Tympanic membrane and ear canal normal. There is no impacted cerumen.      Left Ear: Tympanic membrane and ear canal normal. There is no impacted cerumen.      Nose: Congestion and rhinorrhea present.      Mouth/Throat:      Pharynx: No oropharyngeal exudate.   Eyes:      General: No scleral icterus.        Right eye: No discharge.         Left eye: No discharge.      Conjunctiva/sclera: Conjunctivae normal.   Cardiovascular:      Rate and Rhythm: Normal rate and regular rhythm.      Heart sounds: Normal heart sounds. No murmur heard.  Pulmonary:      Effort: Pulmonary effort is normal.      Breath sounds: Normal breath sounds.   Neurological:      Mental Status: She is alert and oriented to person, place, and time.      Cranial Nerves: No cranial nerve deficit.      Motor: No weakness.   Psychiatric:         Mood and Affect: Mood and affect normal.               Result Review :     The following data was reviewed by: ARABELLA Santana on 05/09/2024:    POC COVID/FLU   Lab Results   Component Value Date    SARSANTIGEN Not Detected 05/13/2024    FLUAAG Not Detected 05/13/2024    FLUBAG Not Detected 05/13/2024    " INTCT Passed 2024    NHANNLULA 3,319,768 2024    EXPIRARADAMES 2,052,025 2024            No Images in the past 120 days found..              Assessment and Plan      Assessment & Plan  Upper respiratory tract infection, unspecified type  We will do antibiotics and steroids.  Patient aware of the risk versus benefits.  Cough medicine given.  Cough in adult    Screening mammogram for breast cancer  Mammo ordered  Postmenopausal  Bone density ordered  Discussed that she would start her steroid and if by the weekend should not feel better she can start the antibiotics.  Orders Placed This Encounter   Procedures    Mammo Screening Digital Tomosynthesis Bilateral With CAD    DEXA Bone Density Axial    POCT SARS-CoV-2 Antigen PIERRE + Flu     New Medications Ordered This Visit   Medications    predniSONE (DELTASONE) 20 MG tablet     Si tab tid for 3 days then 1 tab bid for 3days then 1 tab daily for 3 days     Dispense:  18 tablet     Refill:  0    azithromycin (Zithromax Z-Alexandru) 250 MG tablet     Sig: Take 2 tablets by mouth on day 1, then 1 tablet daily on days 2-5     Dispense:  6 tablet     Refill:  0    benzonatate (TESSALON) 200 MG capsule     Sig: Take 1 capsule by mouth 3 (Three) Times a Day As Needed for Cough.     Dispense:  30 capsule     Refill:  0    promethazine-dextromethorphan (PROMETHAZINE-DM) 6.25-15 MG/5ML syrup     Sig: Take 5 mL by mouth At Night As Needed for Cough.     Dispense:  120 mL     Refill:  0          Diagnosis Plan   1. Upper respiratory tract infection, unspecified type  predniSONE (DELTASONE) 20 MG tablet    azithromycin (Zithromax Z-Alexandru) 250 MG tablet    benzonatate (TESSALON) 200 MG capsule    promethazine-dextromethorphan (PROMETHAZINE-DM) 6.25-15 MG/5ML syrup      2. Cough in adult  POCT SARS-CoV-2 Antigen PIERRE + Flu      3. Screening mammogram for breast cancer  Mammo Screening Digital Tomosynthesis Bilateral With CAD      4. Postmenopausal  DEXA Bone Density Axial             I spent 45 minutes caring for Loan on this date of service. This time includes time spent by me in the following activities:preparing for the visit, reviewing tests, obtaining and/or reviewing a separately obtained history, performing a medically appropriate examination and/or evaluation , counseling and educating the patient/family/caregiver, ordering medications, tests, or procedures, and documenting information in the medical record    Follow Up     Return if symptoms worsen or fail to improve.    Patient was given instructions and counseling regarding her condition or for health maintenance advice. Please see specific information pulled into the AVS if appropriate.     Parts of this note are electronic transcriptions/translations of spoken language to printed text using the Dragon Dictation system.          Destinee Nagel, APRN  05/09/2024

## 2024-05-13 LAB
EXPIRATION DATE: NORMAL
FLUAV AG UPPER RESP QL IA.RAPID: NOT DETECTED
FLUBV AG UPPER RESP QL IA.RAPID: NOT DETECTED
INTERNAL CONTROL: NORMAL
Lab: NORMAL
SARS-COV-2 AG UPPER RESP QL IA.RAPID: NOT DETECTED

## 2024-05-17 ENCOUNTER — PATIENT ROUNDING (BHMG ONLY) (OUTPATIENT)
Dept: FAMILY MEDICINE CLINIC | Facility: CLINIC | Age: 54
End: 2024-05-17
Payer: COMMERCIAL

## 2025-01-08 ENCOUNTER — OFFICE VISIT (OUTPATIENT)
Dept: FAMILY MEDICINE CLINIC | Facility: CLINIC | Age: 55
End: 2025-01-08
Payer: COMMERCIAL

## 2025-01-08 VITALS
BODY MASS INDEX: 27.83 KG/M2 | TEMPERATURE: 97.2 F | SYSTOLIC BLOOD PRESSURE: 108 MMHG | WEIGHT: 163 LBS | DIASTOLIC BLOOD PRESSURE: 69 MMHG | HEART RATE: 80 BPM | OXYGEN SATURATION: 97 % | RESPIRATION RATE: 18 BRPM | HEIGHT: 64 IN

## 2025-01-08 DIAGNOSIS — U07.1 COVID: Primary | ICD-10-CM

## 2025-01-08 DIAGNOSIS — J06.9 UPPER RESPIRATORY TRACT INFECTION, UNSPECIFIED TYPE: ICD-10-CM

## 2025-01-08 DIAGNOSIS — R05.9 COUGH IN ADULT: ICD-10-CM

## 2025-01-08 LAB
EXPIRATION DATE: ABNORMAL
FLUAV AG UPPER RESP QL IA.RAPID: NOT DETECTED
FLUBV AG UPPER RESP QL IA.RAPID: NOT DETECTED
INTERNAL CONTROL: ABNORMAL
Lab: ABNORMAL
SARS-COV-2 AG UPPER RESP QL IA.RAPID: DETECTED

## 2025-01-08 PROCEDURE — 99214 OFFICE O/P EST MOD 30 MIN: CPT | Performed by: NURSE PRACTITIONER

## 2025-01-08 PROCEDURE — 87428 SARSCOV & INF VIR A&B AG IA: CPT | Performed by: NURSE PRACTITIONER

## 2025-01-08 RX ORDER — PREDNISONE 20 MG/1
TABLET ORAL
Qty: 18 TABLET | Refills: 0 | Status: SHIPPED | OUTPATIENT
Start: 2025-01-08

## 2025-01-08 RX ORDER — DEXTROMETHORPHAN HYDROBROMIDE AND PROMETHAZINE HYDROCHLORIDE 15; 6.25 MG/5ML; MG/5ML
5 SYRUP ORAL NIGHTLY PRN
Qty: 120 ML | Refills: 0 | Status: SHIPPED | OUTPATIENT
Start: 2025-01-08

## 2025-01-08 RX ORDER — ALBUTEROL SULFATE AND BUDESONIDE 90; 80 UG/1; UG/1
2 AEROSOL, METERED RESPIRATORY (INHALATION) EVERY 6 HOURS PRN
Qty: 32.1 G | Refills: 0 | Status: SHIPPED | OUTPATIENT
Start: 2025-01-08

## 2025-01-11 NOTE — PROGRESS NOTES
Chief Complaint  Cough, Headache, and Sinusitis    Subjective          Loan Sylvester presents to [facility]  Cough  Associated symptoms include headaches, postnasal drip, rhinorrhea and a sore throat. Pertinent negatives include no chills, fever, rash or shortness of breath.   Headache  Sinusitis  Associated symptoms include congestion, coughing, headaches, sinus pressure and a sore throat. Pertinent negatives include no chills or shortness of breath.     She is here for sick visit.  She said that she has actually canceled anything except when she is sick as she sees cardiology.  She declines to do any type of preventative and does not wish to speak of it currently today.  She has had a cough since Christmas but this past Saturday Sunday the congestion started getting worse.  She has been doing cough drops and Allegra-D though that really has not helped.  She said that her nose burns her throat hurts she is coughing and just very tired.    She has not been around anybody that she is aware of being sick.               Allergies  Aspirin and Ibuprofen    Social History     Tobacco Use    Smoking status: Never    Smokeless tobacco: Never   Vaping Use    Vaping status: Never Used   Substance Use Topics    Alcohol use: Yes     Comment: occassionally    Drug use: Never       Family History   Problem Relation Age of Onset    Other Mother         smoker    Hypertension Mother     Hyperlipidemia Mother     Other Father         smoker    Hyperlipidemia Father     Hypertension Father         Health Maintenance Due   Topic Date Due    TDAP/TD VACCINES (1 - Tdap) Never done    ZOSTER VACCINE (1 of 2) Never done    MAMMOGRAM  01/09/2022    HEPATITIS C SCREENING  Never done    ANNUAL PHYSICAL  Never done    LIPID PANEL  01/10/2023    COVID-19 Vaccine (1 - 2024-25 season) Never done    COLORECTAL CANCER SCREENING  02/21/2025          There is no immunization history on file for this  "patient.    Review of Systems   Constitutional:  Negative for chills and fever.   HENT:  Positive for congestion, postnasal drip, rhinorrhea, sinus pressure and sore throat.    Respiratory:  Positive for cough. Negative for shortness of breath.    Gastrointestinal:  Negative for diarrhea, nausea and vomiting.   Skin:  Negative for rash.        Objective       Vitals:    01/08/25 1121   BP: 108/69   Pulse: 80   Resp: 18   Temp: 97.2 °F (36.2 °C)   SpO2: 97%   Weight: 73.9 kg (163 lb)   Height: 162.6 cm (64\")       Body mass index is 27.98 kg/m².         Physical Exam  Vitals reviewed.   Constitutional:       Appearance: Normal appearance. She is well-developed.   HENT:      Right Ear: Tympanic membrane and ear canal normal. There is no impacted cerumen.      Left Ear: Tympanic membrane and ear canal normal. There is no impacted cerumen.      Nose: Congestion and rhinorrhea present.      Mouth/Throat:      Pharynx: Posterior oropharyngeal erythema present. No oropharyngeal exudate.   Eyes:      General: No scleral icterus.        Right eye: No discharge.         Left eye: No discharge.      Conjunctiva/sclera: Conjunctivae normal.   Cardiovascular:      Rate and Rhythm: Normal rate and regular rhythm.      Heart sounds: Normal heart sounds. No murmur heard.  Pulmonary:      Effort: Pulmonary effort is normal.      Breath sounds: Wheezing present. No rhonchi.   Neurological:      Mental Status: She is alert and oriented to person, place, and time.      Cranial Nerves: No cranial nerve deficit.      Motor: No weakness.   Psychiatric:         Mood and Affect: Mood and affect normal.               Result Review :     The following data was reviewed by: ARABELLA Santana on 01/08/2025:    POC COVID No components found for: \"SARSANTIGEN\"   POC FLU  No results found for: \"RAPFLUA\", \"RAPFLUB\"   POC COVID/FLU No components found for: \"SARSANTIGEN\", \"FLUAAG\", \"FLUBAG\", \"INTCT\", \"LOTNUMBER\", \"EXPIRATDTE\" "       COVID-positive in office flu negative  No Images in the past 120 days found..              Assessment and Plan      Assessment & Plan  Cough in adult    Orders:    POCT SARS-CoV-2 Antigen PIERRE + Flu    COVID  We will do the Paxlovid with her history.  Push fluids.  Walk albuterol inhaler with mouth out after each use.  Call if symptoms are not better by Monday.  To the ER if there is any increased shortness of breath dizziness lightheadedness.  Orders:    Nirmatrelvir & Ritonavir, 300mg/100mg, (PAXLOVID); Take 3 tablets by mouth 2 (Two) Times a Day for 5 days.    Albuterol-Budesonide (Airsupra) 90-80 MCG/ACT aerosol; Inhale 2 puffs Every 6 (Six) Hours As Needed (cough).    Upper respiratory tract infection, unspecified type    Orders:    promethazine-dextromethorphan (PROMETHAZINE-DM) 6.25-15 MG/5ML syrup; Take 5 mL by mouth At Night As Needed for Cough.    predniSONE (DELTASONE) 20 MG tablet; 1 tab tid for 3 days then 1 tab bid for 3days then 1 tab daily for 3 days       Diagnosis Plan   1. COVID  Nirmatrelvir & Ritonavir, 300mg/100mg, (PAXLOVID)    Albuterol-Budesonide (Airsupra) 90-80 MCG/ACT aerosol      2. Cough in adult  POCT SARS-CoV-2 Antigen PIERRE + Flu      3. Upper respiratory tract infection, unspecified type  promethazine-dextromethorphan (PROMETHAZINE-DM) 6.25-15 MG/5ML syrup    predniSONE (DELTASONE) 20 MG tablet                Follow Up     Return if symptoms worsen or fail to improve.    Patient was given instructions and counseling regarding her condition or for health maintenance advice. Please see specific information pulled into the AVS if appropriate.     Parts of this note are electronic transcriptions/translations of spoken language to printed text using the Dragon Dictation system.          Destinee Nagel, APRN  01/08/2025

## 2025-01-16 DIAGNOSIS — I25.10 CORONARY ARTERY DISEASE INVOLVING NATIVE CORONARY ARTERY OF NATIVE HEART WITHOUT ANGINA PECTORIS: ICD-10-CM

## 2025-01-16 DIAGNOSIS — E78.2 MIXED HYPERLIPIDEMIA: ICD-10-CM

## 2025-01-16 DIAGNOSIS — I10 ESSENTIAL HYPERTENSION: ICD-10-CM

## 2025-01-16 RX ORDER — ROSUVASTATIN CALCIUM 5 MG/1
5 TABLET, COATED ORAL DAILY
Qty: 30 TABLET | Refills: 11 | OUTPATIENT
Start: 2025-01-16

## 2025-01-16 RX ORDER — CLOPIDOGREL BISULFATE 75 MG/1
75 TABLET ORAL DAILY
Qty: 30 TABLET | Refills: 11 | OUTPATIENT
Start: 2025-01-16

## 2025-01-16 RX ORDER — AMLODIPINE BESYLATE 5 MG/1
5 TABLET ORAL DAILY
Qty: 30 TABLET | Refills: 11 | OUTPATIENT
Start: 2025-01-16

## 2025-01-16 RX ORDER — METOPROLOL SUCCINATE 50 MG/1
50 TABLET, EXTENDED RELEASE ORAL DAILY
Qty: 30 TABLET | Refills: 11 | OUTPATIENT
Start: 2025-01-16

## 2025-01-22 DIAGNOSIS — E78.2 MIXED HYPERLIPIDEMIA: ICD-10-CM

## 2025-01-22 DIAGNOSIS — I25.10 CORONARY ARTERY DISEASE INVOLVING NATIVE CORONARY ARTERY OF NATIVE HEART WITHOUT ANGINA PECTORIS: ICD-10-CM

## 2025-01-22 DIAGNOSIS — I10 ESSENTIAL HYPERTENSION: ICD-10-CM

## 2025-01-22 RX ORDER — ROSUVASTATIN CALCIUM 5 MG/1
5 TABLET, COATED ORAL DAILY
Qty: 30 TABLET | Refills: 11 | OUTPATIENT
Start: 2025-01-22

## 2025-01-22 RX ORDER — METOPROLOL SUCCINATE 50 MG/1
50 TABLET, EXTENDED RELEASE ORAL DAILY
Qty: 30 TABLET | Refills: 11 | OUTPATIENT
Start: 2025-01-22

## 2025-01-22 RX ORDER — AMLODIPINE BESYLATE 5 MG/1
5 TABLET ORAL DAILY
Qty: 30 TABLET | Refills: 11 | OUTPATIENT
Start: 2025-01-22

## 2025-01-22 RX ORDER — CLOPIDOGREL BISULFATE 75 MG/1
75 TABLET ORAL DAILY
Qty: 30 TABLET | Refills: 11 | OUTPATIENT
Start: 2025-01-22

## 2025-01-27 DIAGNOSIS — I25.10 CORONARY ARTERY DISEASE INVOLVING NATIVE CORONARY ARTERY OF NATIVE HEART WITHOUT ANGINA PECTORIS: ICD-10-CM

## 2025-01-27 DIAGNOSIS — I10 ESSENTIAL HYPERTENSION: ICD-10-CM

## 2025-01-27 RX ORDER — METOPROLOL SUCCINATE 50 MG/1
50 TABLET, EXTENDED RELEASE ORAL DAILY
Qty: 30 TABLET | Refills: 11 | Status: SHIPPED | OUTPATIENT
Start: 2025-01-27 | End: 2025-01-31 | Stop reason: SDUPTHER

## 2025-01-27 NOTE — TELEPHONE ENCOUNTER
Caller: AngelaLinetteLoan Marsh    Relationship: Self    Best call back number: [phone]     Requested Prescriptions:   Requested Prescriptions     Pending Prescriptions Disp Refills    metoprolol succinate XL (TOPROL-XL) 50 MG 24 hr tablet 30 tablet 11     Sig: Take 1 tablet by mouth Daily.        Pharmacy where request should be sent: [facility] [id number] [facility] - [phone]  - [phone]     Last office visit with prescribing clinician: Visit date not found   Last telemedicine visit with prescribing clinician: Visit date not found   Next office visit with prescribing clinician: Visit date not found     Additional details provided by patient: PATIENT HAS BEEN OUT OF MEDICATION FOR 4 DAYS. LAST OV NOTE 1.10.23. PATIENT HAS BEEN SCHEDULED TO SEE TEMI LUCIO ON 1.31.25.    Does the patient have less than a 3 day supply:  [x] Yes  [] No    Would you like a call back once the refill request has been completed: [] Yes [x] No    If the office needs to give you a call back, can they leave a voicemail: [x] Yes [] No    Ml Malone Rep   01/27/25 11:07 EST

## 2025-01-31 ENCOUNTER — OFFICE VISIT (OUTPATIENT)
Dept: CARDIOLOGY | Facility: CLINIC | Age: 55
End: 2025-01-31
Payer: COMMERCIAL

## 2025-01-31 VITALS
BODY MASS INDEX: 28.85 KG/M2 | DIASTOLIC BLOOD PRESSURE: 80 MMHG | SYSTOLIC BLOOD PRESSURE: 117 MMHG | HEIGHT: 64 IN | HEART RATE: 97 BPM | WEIGHT: 169 LBS

## 2025-01-31 DIAGNOSIS — E78.2 MIXED HYPERLIPIDEMIA: ICD-10-CM

## 2025-01-31 DIAGNOSIS — I10 ESSENTIAL HYPERTENSION: ICD-10-CM

## 2025-01-31 DIAGNOSIS — I25.10 CORONARY ARTERY DISEASE INVOLVING NATIVE CORONARY ARTERY OF NATIVE HEART WITHOUT ANGINA PECTORIS: Primary | ICD-10-CM

## 2025-01-31 PROCEDURE — 99214 OFFICE O/P EST MOD 30 MIN: CPT | Performed by: FAMILY MEDICINE

## 2025-01-31 RX ORDER — CLOPIDOGREL BISULFATE 75 MG/1
75 TABLET ORAL DAILY
Qty: 30 TABLET | Refills: 11 | Status: SHIPPED | OUTPATIENT
Start: 2025-01-31

## 2025-01-31 RX ORDER — METOPROLOL SUCCINATE 50 MG/1
50 TABLET, EXTENDED RELEASE ORAL DAILY
Qty: 30 TABLET | Refills: 11 | Status: SHIPPED | OUTPATIENT
Start: 2025-01-31

## 2025-01-31 RX ORDER — AMLODIPINE BESYLATE 5 MG/1
5 TABLET ORAL DAILY
Qty: 30 TABLET | Refills: 11 | Status: SHIPPED | OUTPATIENT
Start: 2025-01-31

## 2025-01-31 RX ORDER — ROSUVASTATIN CALCIUM 5 MG/1
5 TABLET, COATED ORAL DAILY
Qty: 30 TABLET | Refills: 11 | Status: SHIPPED | OUTPATIENT
Start: 2025-01-31

## 2025-01-31 NOTE — PROGRESS NOTES
Chief Complaint  Coronary Artery Disease and Follow-up    Subjective        History of Present Illness  Loan Sylvester presents to [facility]   Ms. Sylvester  presents to office for annual follow-up visit for coronary disease, hypertension, and hyperlipidemia. She denies any episodes of chest pain, shortness of breath or palpitations.  She denies any bleeding issues.  She is taking all the medicines as prescribed.  Patient reports overall doing very well.    Past History:      1) Coronary artery disease, Index presentation non-STEMI years back. The patient underwent balloon angioplasty by Dr. Siva Jacobs with no stent placement; 2) True aspirin allergy; 3) Hypertension; 4) Negative for diabetes mellitus.      Past Medical History:   Diagnosis Date    Coronary artery disease     Hyperlipidemia     Hypertension        Allergies   Allergen Reactions    Aspirin Anaphylaxis    Ibuprofen Anaphylaxis        Past Surgical History:   Procedure Laterality Date    CARDIAC CATHETERIZATION      HYSTERECTOMY      age 25 ovaries are still there        Social History  She  reports that she has never smoked. She has never used smokeless tobacco. She reports current alcohol use. She reports that she does not use drugs.    Family History  Her family history includes Hyperlipidemia in her father and mother; Hypertension in her father and mother; Other in her father and mother.       Current Outpatient Medications on File Prior to Visit   Medication Sig    [DISCONTINUED] amLODIPine (NORVASC) 5 MG tablet TAKE ONE TABLET BY MOUTH DAILY    [DISCONTINUED] clopidogrel (PLAVIX) 75 MG tablet TAKE ONE TABLET BY MOUTH DAILY    [DISCONTINUED] metoprolol succinate XL (TOPROL-XL) 50 MG 24 hr tablet Take 1 tablet by mouth Daily.    [DISCONTINUED] rosuvastatin (CRESTOR) 5 MG tablet TAKE ONE TABLET BY MOUTH DAILY    acetaminophen (TYLENOL) 325 MG tablet Take 2 tablets by mouth Every 6 (Six) Hours As Needed  "for Mild Pain. (Patient not taking: Reported on 1/31/2025)    Albuterol-Budesonide (Airsupra) 90-80 MCG/ACT aerosol Inhale 2 puffs Every 6 (Six) Hours As Needed (cough). (Patient not taking: Reported on 1/31/2025)    cetirizine (zyrTEC) 10 MG tablet Take 10 mg by mouth Daily. (Patient not taking: Reported on 5/9/2024)    [DISCONTINUED] predniSONE (DELTASONE) 20 MG tablet 1 tab tid for 3 days then 1 tab bid for 3days then 1 tab daily for 3 days (Patient not taking: Reported on 1/31/2025)    [DISCONTINUED] promethazine-dextromethorphan (PROMETHAZINE-DM) 6.25-15 MG/5ML syrup Take 5 mL by mouth At Night As Needed for Cough. (Patient not taking: Reported on 1/31/2025)     No current facility-administered medications on file prior to visit.         Review of Systems   Respiratory:  Negative for chest tightness and shortness of breath.    Cardiovascular:  Negative for chest pain, palpitations and leg swelling.   Gastrointestinal:  Negative for abdominal pain, blood in stool, nausea and vomiting.   Neurological:  Negative for dizziness, syncope, light-headedness and headache.        Objective   Vitals:    01/31/25 0922   BP: 117/80   Pulse: 97   Weight: 76.7 kg (169 lb)   Height: 162.6 cm (64\")         Physical Exam  General : Alert, awake, no acute distress  Neck : Supple, no carotid bruit, no jugular venous distention  CVS : Regular rate and rhythm, no murmur, no rubs or gallops  Lungs: Clear to auscultation bilaterally, no crackles or rhonchi  Abdomen: Soft, nontender, bowel sounds active  Extremities: Warm, well-perfused, no pedal edema      Result Review     The following data was reviewed by Ekaterina Castellon, APRN  No results found for: \"PROBNP\"       Lab Results   Component Value Date    TSH 4.090 11/21/2019      No results found for: \"FREET4\"   No results found for: \"DDIMERQUANT\"  No results found for: \"MG\"   No results found for: \"DIGOXIN\"   No results found for: \"TROPONINT\"                         Assessment and " Plan   Diagnoses and all orders for this visit:    1. Coronary artery disease involving native coronary artery of native heart without angina pectoris (Primary)  Assessment & Plan:  Index presentation non-STEMI years back. The patient underwent balloon angioplasty by with no stent placement. Currently stable with no angina-like symptoms with LV systolic function is preserved. Continue plavix 75 mg daily , crestor 5 mg at night, and metoprolol XL 50 mg daily. Will order CBC      Orders:  -     CBC (No Diff); Future  -     clopidogrel (PLAVIX) 75 MG tablet; Take 1 tablet by mouth Daily.  Dispense: 30 tablet; Refill: 11  -     metoprolol succinate XL (TOPROL-XL) 50 MG 24 hr tablet; Take 1 tablet by mouth Daily.  Dispense: 30 tablet; Refill: 11  -     rosuvastatin (CRESTOR) 5 MG tablet; Take 1 tablet by mouth Daily.  Dispense: 30 tablet; Refill: 11    2. Essential hypertension  Assessment & Plan:  Blood pressure very well controlled. Continue amlodipine 5 mg daily and metoprolol XL 50 mg daily . Will repeat CMP     Orders:  -     Comprehensive Metabolic Panel; Future  -     amLODIPine (NORVASC) 5 MG tablet; Take 1 tablet by mouth Daily.  Dispense: 30 tablet; Refill: 11  -     metoprolol succinate XL (TOPROL-XL) 50 MG 24 hr tablet; Take 1 tablet by mouth Daily.  Dispense: 30 tablet; Refill: 11    3. Mixed hyperlipidemia  Assessment & Plan:   No recent lipid panel available. Last lipid panel was 1/10/2022, LDL was  89. Continue rosuvastatin 5 mg daily. We will repeat the CMP and lipid panel now and adjust the dose if needed.     Orders:  -     Comprehensive Metabolic Panel; Future  -     Lipid Panel; Future  -     rosuvastatin (CRESTOR) 5 MG tablet; Take 1 tablet by mouth Daily.  Dispense: 30 tablet; Refill: 11                Follow Up   Return in about 1 year (around 1/31/2026) for Dr Enriquez.    Patient was given instructions and counseling regarding her condition or for health maintenance advice. Please see specific  information pulled into the AVS if appropriate.       Seen with ARABELLA Avila  Signed,  ARABELLA Agudelo  01/31/2025     Dictated Utilizing Dragon Dictation: Please note that portions of this note were completed with a voice recognition program.  Part of this note may be an electronic transcription/translation of spoken language to printed text using the Dragon Dictation System.

## 2025-01-31 NOTE — ASSESSMENT & PLAN NOTE
Index presentation non-STEMI years back. The patient underwent balloon angioplasty by with no stent placement. Currently stable with no angina-like symptoms with LV systolic function is preserved. Continue plavix 75 mg daily , crestor 5 mg at night, and metoprolol XL 50 mg daily. Will order CBC

## 2025-01-31 NOTE — ASSESSMENT & PLAN NOTE
No recent lipid panel available. Last lipid panel was 1/10/2022, LDL was  89. Continue rosuvastatin 5 mg daily. We will repeat the CMP and lipid panel now and adjust the dose if needed.

## 2025-01-31 NOTE — ASSESSMENT & PLAN NOTE
Blood pressure very well controlled. Continue amlodipine 5 mg daily and metoprolol XL 50 mg daily . Will repeat CMP